# Patient Record
(demographics unavailable — no encounter records)

---

## 2024-12-18 NOTE — HISTORY OF PRESENT ILLNESS
[FreeTextEntry6] : Pt BIB mother for c/o congestion x 1 week now more fussy and tugging on ears good PO / UOP / BMs denies n/v/d  denies fevers, wheezing or trouble breathing

## 2024-12-23 NOTE — PHYSICAL EXAM

## 2024-12-23 NOTE — HISTORY OF PRESENT ILLNESS
[Mother] : mother [Cow's milk (Ounces per day ___)] : consumes [unfilled] oz of Cow's milk per day [Fruit] : fruit [Vegetables] : vegetables [Meat] : meat [Cereal] : cereal [Eggs] : eggs [Finger Foods] : finger foods [Table food] : table food [Normal] : Normal [Sippy cup use] : Sippy cup use [Brushing teeth] : Brushing teeth [Vitamin] : Primary Fluoride Source: Vitamin [Playtime] : Playtime  [Temper Tantrums] : Temper Tantrums [No] : No cigarette smoke exposure [Water heater temperature set at <120 degrees F] : Water heater temperature set at <120 degrees F [Car seat in back seat] : Car seat in back seat [Carbon Monoxide Detectors] : Carbon monoxide detectors [Smoke Detectors] : Smoke detectors [Up to date] : Up to date [FreeTextEntry7] : Doing well [de-identified] : None [FreeTextEntry1] : 18 month old girl here for routine PE. Doing well. No current concerns. Walks, runs, many words, understands all.  Growth and development wnl.  Good po/uop/bm. Normal sleep and activity. Dx with left AOM 5 days ago, has been taking oral Amoxicillin. Sx have resolved.

## 2024-12-23 NOTE — DISCUSSION/SUMMARY
[Normal Growth] : growth [Normal Development] : development [None] : No known medical problems [No Elimination Concerns] : elimination [No Feeding Concerns] : feeding [No Skin Concerns] : skin [Normal Sleep Pattern] : sleep [Family Support] : family support [Child Development and Behavior] : child development and behavior [Language Promotion/Hearing] : language promotion/hearing [Toliet Training Readiness] : toliet training readiness [Safety] : safety [No Medications] : ~He/She~ is not on any medications [Parent/Guardian] : parent/guardian [] : The components of the vaccine(s) to be administered today are listed in the plan of care. The disease(s) for which the vaccine(s) are intended to prevent and the risks have been discussed with the caretaker.  The risks are also included in the appropriate vaccination information statements which have been provided to the patient's caregiver.  The caregiver has given consent to vaccinate. [FreeTextEntry1] : Anticipatory guidance and parent education given. Continue whole cow's milk. Continue table foods, 3 meals with 2-3 snacks per day.  Importance of well-balanced, healthy diet discussed. Incorporate water daily in a sippy cup. Brush teeth twice a day with soft toothbrush. Recommend visit to dentist. When in car, keep child in rear-facing car seats until age 2, or until the maximum height and weight for seat is reached. Put toddler to sleep in own bed or crib. Help toddler to maintain consistent daily routines and sleep schedule. Toilet training discussed. Recognize anxiety in new settings. Ensure home is safe. Be within arm's reach of toddler at all times. Use consistent, positive discipline. Read aloud to toddler. DTaP, Hib vaccines given. CBC, Lead, Ferritin ordered. No evidence of AOM on today's exam. May discontinue oral Amoxicillin. Follow up in 6 months for PE.

## 2024-12-31 NOTE — PHYSICAL EXAM
[Rales] : no rales [Crackles] : no crackles [Transmitted Upper Airway Sounds] : no transmitted upper airway sounds [Tachypnea] : no tachypnea [Belly Breathing] : no belly breathing [Subcostal Retractions] : no subcostal retractions [Suprasternal Retractions] : no suprasternal retractions [NL] : warm, clear [FreeTextEntry7] : some intermittent diffuse expiratory wheezing with coarse breath sounds. RR=32

## 2024-12-31 NOTE — HISTORY OF PRESENT ILLNESS
[de-identified] : cough, fever [FreeTextEntry6] : BIB mother for wet sounding cough, fevers x4 days. No medication taken today.  Sister with similar symptoms. Mom has albuterol neb for sister at home which she administered to patient last night with some relief of cough.  No difficulty breathing. Vomit x1 in car on way to office this am. No diarrhea. No rash. No fatigue. Decreased pos but normal uop/bm. Normal sleep and activity.

## 2024-12-31 NOTE — DISCUSSION/SUMMARY
[FreeTextEntry1] : Anticipatory guidance and parent education given.  Viral testing differed by mother Discussed s/s of resp depression, when to take patient to ER  May use Tylenol or Ibuprofen as needed for fever or discomfort. Recommend supportive care including increased fluids, rest, and nasal saline followed by nasal suction.  Return if symptoms worsen or persist.

## 2025-03-20 NOTE — DISCUSSION/SUMMARY
[FreeTextEntry1] : Complete amoxicillin course Potential side effect of antibiotics includes but not limited to diarrhea Provide ibuprofen as needed for pain or fever Recommend nasal saline and humidified air  Appropriate anticipatory guidance and education given; seek care if symptoms persist or worsen, persistent fever, continued dec UOP. If no improvement within 48 hours return for re-evaluation.

## 2025-03-20 NOTE — PHYSICAL EXAM
[Clear Effusion] : clear effusion [Erythema] : erythema [Bulging] : bulging [Purulent Effusion] : purulent effusion [Erythematous Oropharynx] : erythematous oropharynx [Vesicles] : no vesicles [NL] : warm, clear [de-identified] : moist mucus membranes

## 2025-06-26 NOTE — DISCUSSION/SUMMARY
[Normal Growth] : growth [Normal Development] : development [None] : No known medical problems [No Elimination Concerns] : elimination [No Feeding Concerns] : feeding [No Skin Concerns] : skin [Normal Sleep Pattern] : sleep [Assessment of Language Development] : assessment of language development [Temperament and Behavior] : temperament and behavior [Toilet Training] : toilet training [TV Viewing] : tv viewing [Safety] : safety [No Medications] : ~He/She~ is not on any medications [Parent/Guardian] : parent/guardian [] : The components of the vaccine(s) to be administered today are listed in the plan of care. The disease(s) for which the vaccine(s) are intended to prevent and the risks have been discussed with the caretaker.  The risks are also included in the appropriate vaccination information statements which have been provided to the patient's caregiver.  The caregiver has given consent to vaccinate. [FreeTextEntry1] : Anticipatory guidance and parent education given. Continue cow's milk. Continue table foods, 3 meals with 2-3 snacks per day. Incorporate water daily in a sippy cup. Brush teeth twice a day with soft toothbrush. Recommend visit to dentist. Fluoride daily. When in car, keep child in rear-facing car seats until age 2, or until  the maximum height and weight for seat is reached. Put toddler to sleep in own bed. Help toddler to maintain consistent daily routines and sleep schedule. Toilet training discussed. Ensure home is safe. Use consistent, positive discipline. Read aloud to toddler. Limit screen time to no more than 2 hours per day. CBC, Lead, Ferritin ordered. Hepatitis A vaccine given. Trial of Miralax for stool withholding/constipation. Return in 6 months for PE.

## 2025-06-26 NOTE — PHYSICAL EXAM

## 2025-06-26 NOTE — HISTORY OF PRESENT ILLNESS
[Mother] : mother [Cow's milk (Ounces per day ___)] : consumes [unfilled] oz of Cow's milk per day [Fruit] : fruit [Vegetables] : vegetables [Meat] : meat [Cereal] : cereal [Eggs] : eggs [Finger Foods] : finger foods [Table food] : table food [Dairy] : dairy [Normal] : Normal [Brushing teeth] : Brushing teeth [Yes] : Patient goes to dentist yearly [Toothpaste] : Primary Fluoride Source: Toothpaste [Playtime 60 min a day] : Playtime 60 min a day [Temper Tantrums] : Temper Tantrums [Toilet Training] : Toilet training [<2 hrs of screen time] : Less than 2 hrs of screen time [No] : No cigarette smoke exposure [Water heater temperature set at <120 degrees F] : Water heater temperature set at <120 degrees F [Car seat in back seat] : Car seat in back seat [Smoke Detectors] : Smoke detectors [Carbon Monoxide Detectors] : Carbon monoxide detectors [Up to date] : Up to date [At risk for exposure to TB] : Not at risk for exposure to Tuberculosis [FreeTextEntry7] : Doing well [de-identified] : Shy, stool withholding [FreeTextEntry1] : 2 year old girl here for routine PE. Doing well.  Good po/uop/bm. Normal sleep and activity. Many words, short phrases, understands all. Jumps, climbs stairs, pretend play. Growth and development wnl. Pt is very shy with children other than her sisters, with whom she plays appropriately. Pt with stool withholding behaviors, stools occasionally hard and formed, cries with BMs.